# Patient Record
Sex: FEMALE | Race: WHITE
[De-identification: names, ages, dates, MRNs, and addresses within clinical notes are randomized per-mention and may not be internally consistent; named-entity substitution may affect disease eponyms.]

---

## 2017-01-10 ENCOUNTER — HOSPITAL ENCOUNTER (OUTPATIENT)
Dept: HOSPITAL 61 - PNCL | Age: 52
Discharge: HOME | End: 2017-01-10
Attending: ANESTHESIOLOGY
Payer: COMMERCIAL

## 2017-01-10 DIAGNOSIS — M96.1: ICD-10-CM

## 2017-01-10 DIAGNOSIS — M51.16: Primary | ICD-10-CM

## 2017-01-10 PROCEDURE — 62323 NJX INTERLAMINAR LMBR/SAC: CPT

## 2017-01-11 NOTE — PAIN
DATE OF SERVICE:  01/10/2017



DIAGNOSES:

1.  Lumbar radiculopathy with lumbar herniated disk.

2.  Post-lumbar laminectomy syndrome.



HISTORY OF PRESENT ILLNESS:  The patient is a 51-year-old female who returns for

followup status post caudal epidural steroid injection x 1 on 11/08/2016.  The

patient reports she did very well, about 85-90% improvement.  We had elected not

to pursue any further injections after the first injection.  She is doing so

well on her followup in late November. Patient reports that the pain is now

returning to a more significant extent in the low back and bilateral lower

extremities with some numbness in the toes on the right foot as well as sharp

pain in the leg and thigh, posterior calf, posterior gluteus with some numbness

in the thigh, also patient reports it has been increasing.  It is now 4 on a

scale of 10, was much worse about a week ago.  The patient reports no new motor

or sensory deficits, however, no new bowel or bladder incontinence or other

complaints.  The patient's old chart was reviewed as her current medication

regimen and updated.  Current review of systems updated today as well.



PHYSICAL EXAMINATION:

VITAL SIGNS:  The patient's blood pressure is 160/88, pulse 85, respirations 18,

temperature 98.2 degrees Fahrenheit, height is 70 inches, weighs 284 pounds.

GENERAL:  The patient is awake, alert, oriented, appropriate, very pleasant

demeanor.

HEENT:  Head shows normocephalic, atraumatic.  Extraocular movements are intact

and symmetrical.  Oral cavity shows mucous membranes moist and pink.

NECK:  Shows anterior throat supple.

CHEST:  Shows breath sounds clear to auscultation bilaterally.

HEART:  Shows S1 and S2 clear.

ABDOMEN:  Soft, nontender, nondistended.  No palpable organomegaly.  No rebound

or guarding demonstrated.

BACK:  The patient's back shows spine grossly midline.  Slight exaggeration of

thoracic kyphosis, some moderate flattening of lumbar lordotic curvature.  Well

healed surgical scarring is noted once again.  Lumbar paraspinous musculature

shows some moderate tenderness to palpation bilaterally but without radiation. 

No tenderness over the sacrum or sacroiliac regions.  The patient shows good

rotation and motion of the lumbar spine, both laterally as well as extension and

flexion.

EXTREMITIES:  Lower extremities show deep tendon reflexes 1+/4 in the patellar

and tendo calcaneus tendons are equal.  Motor exam is strong with 5/5

dorsiflexion and extension and equal bilaterally.



PLAN:  Options were discussed with the patient.  We will proceed with a caudal

approach epidural steroid injection today as the second in this series with

fluoroscopic guidance.  Risks were again discussed including, but not limited to

bleeding, infection, possibility of epidural hematoma, subsequent neurologic

compromise, dural puncture, headaches, spinal cord and/or nerve damage, side

effects of steroid medication and poor results regarding pain control.  The

patient understands and wishes to proceed.  The patient will return to clinic in

approximately 2 weeks for followup, was counseled on return appointment,

activity level and side effects to be aware of.



DIAGNOSIS:  Lumbar radiculopathy with lumbar herniated disk and post-lumbar

laminectomy syndrome.



PROCEDURES:  Caudal approach epidural steroid injection with fluoroscopic

guidance under sterile prep and drape using local anesthetic.



MEDICATIONS INJECTED: Depo-Medrol 120 mg plus 10 mL of preservative-free normal

saline and 2 mL of Isovue contrast.  



CONDITION AT DISCHARGE:  Stable.  The patient tolerated the procedure well, had

no complications.

 



______________________________

CHRISTOPHER MOON MD



DR:  THERESE/jenniffer  JOB#:  [id number] / [id number]

DD:  01/10/2017 12:38  DT:  01/11/2017 03:25

## 2017-01-24 ENCOUNTER — HOSPITAL ENCOUNTER (OUTPATIENT)
Dept: HOSPITAL 61 - PNCL | Age: 52
Discharge: HOME | End: 2017-01-24
Attending: ANESTHESIOLOGY
Payer: COMMERCIAL

## 2017-01-24 DIAGNOSIS — M51.16: Primary | ICD-10-CM

## 2017-01-24 DIAGNOSIS — M96.1: ICD-10-CM

## 2017-01-24 PROCEDURE — 62327 NJX INTERLAMINAR LMBR/SAC: CPT

## 2017-01-24 PROCEDURE — 62323 NJX INTERLAMINAR LMBR/SAC: CPT

## 2017-01-25 NOTE — PAIN
DATE OF SERVICE:  01/24/2017



DIAGNOSES:  Lumbar radiculopathy with lumbar herniated disk and post-lumbar

laminectomy syndrome.



HISTORY OF PRESENT ILLNESS:  The patient is a 51-year-old female who returns for

follow up status post caudal approach epidural steroid injections x 2.  The

patient reports approximately initially 85-90% improvement, but the last

injection only lasting about 3-4 days after the injection.  The patient reports

that she is still having significant pain across the low back and the bilateral

lower extremities, right greater than left as previously.  The patient reports

no new motor or sensory deficits, no new bowel or bladder incontinence or other

complaints, but still significant pain rated at 4/5 on a scale of 10, burning

and sharp in quality in the low back, radiating to the posterior gluteus,

posterior thighs, in the lower legs as well posteriorly and in the right and

left lateral and upper and lower leg as well, again worse on the right side.



PHYSICAL EXAMINATION:

VITAL SIGNS:  The patient's blood pressure is 134/82, pulse is 87, respirations

18, temperature 98.2 degrees Fahrenheit.  Height is 70 inches.  Weight is 279

pounds.

GENERAL:  The patient is awake, alert, oriented, appropriate, very pleasant

demeanor.

HEENT:  Shows normocephalic, atraumatic.  Extraocular movements are intact and

symmetrical.  Oral cavity shows mucous membranes are moist and pink.  Dentition

is intact.

NECK:  Shows anterior throat supple without palpable lymphadenopathy noted. 

Swallow reflex is symmetrical.

CHEST:  Shows normal on inspection.  Breath sounds are clear to auscultation

bilaterally.

HEART:  Shows S1 and S2 clear.

ABDOMEN:  Obese, soft, nontender, nondistended.

BACK:  Shows spine grossly in midline, mild flattening of lumbar lordotic

curvature again noted, a well-healed surgical scar in the lumbar distribution. 

Musculature appears symmetrical on inspection with palpation shows some moderate

tenderness with palpation in the low lumbar distribution bilaterally, but is

diffuse without specific trigger points radiation or asymmetry.  No tenderness

over the sacrum or sacroiliac regions.  The patient shows good rotation and

motion of the lumbar spine, both laterally as well as extension and flexion

without significant pain.

EXTREMITIES:  Lower extremities show deep tendon reflexes 1+ in the patellar and

tendo calcaneus tendons, are equal.  Motor exam is strong with dorsiflexion,

extension, quadriceps and hamstring flexion rated at 5/5 and symmetrical. 

Peripheral pulses are 1+ posterior tibial and dorsalis pedis pulses.



Options were discussed with the patient.  The patient's old chart was reviewed

as is her current medication regimen and updated.  Current review of systems

updated today as well.  We will plan on the third caudal approach epidural

steroid injection today with fluoroscopic guidance.  Risks were again discussed

including, but not limited to bleeding, infection, possibility of epidural

hematoma and subsequent neurological compromise, dural puncture, headaches,

spinal cord and/or nerve damage, side effects of steroid medication and poor

results regarding pain control.  The patient understands and wishes to proceed. 

The patient will proceed with the third injection in a series of three, ____

further injections about 6 months period of time from her first injection.  She

is following up with her neurosurgeon in approximately 1 week and will explore

possible alternatives with him as well.  The patient was counseled as to

activity levels as well as side effects to be aware of and to contact the office

with any further questions or needs.



DIAGNOSES:  Lumbar radiculopathy with lumbar herniated disk and post-lumbar

laminectomy syndrome.



PROCEDURE:  Caudal approach epidural steroid injection using the C-arm

fluoroscopic guidance, sterile prep and drape and local anesthesia.  Medications

injected 120 mg of Depo-Medrol plus 10 mL of preservative-free normal saline and

2 mL Isovue for contrast.



Condition at discharge is stable.  The patient tolerated the procedure well, had

no complications.

 



______________________________

CHRISTOPHER MOON MD



DR:  THERESE/jenniffer  JOB#:  [id number] / [id number]

DD:  01/24/2017 11:51  DT:  01/25/2017 01:47

## 2017-02-13 ENCOUNTER — HOSPITAL ENCOUNTER (OUTPATIENT)
Dept: HOSPITAL 61 - RAD | Age: 52
Discharge: HOME | End: 2017-02-13
Attending: NEUROLOGICAL SURGERY
Payer: COMMERCIAL

## 2017-02-13 VITALS
SYSTOLIC BLOOD PRESSURE: 158 MMHG | DIASTOLIC BLOOD PRESSURE: 81 MMHG | SYSTOLIC BLOOD PRESSURE: 158 MMHG | DIASTOLIC BLOOD PRESSURE: 81 MMHG | SYSTOLIC BLOOD PRESSURE: 158 MMHG | DIASTOLIC BLOOD PRESSURE: 81 MMHG

## 2017-02-13 VITALS — WEIGHT: 277 LBS | HEIGHT: 70 IN | BODY MASS INDEX: 39.65 KG/M2

## 2017-02-13 DIAGNOSIS — E04.1: Primary | ICD-10-CM

## 2017-02-13 DIAGNOSIS — M54.16: ICD-10-CM

## 2017-02-13 PROCEDURE — 72132 CT LUMBAR SPINE W/DYE: CPT

## 2017-02-13 PROCEDURE — 72265 MYELOGRAPHY L-S SPINE: CPT

## 2017-02-13 NOTE — RAD
Lumbar myelogram, 2/13/2017:



History: Lumbar radiculopathy



Under local anesthesia, aseptic conditions and fluoroscopic guidance a lumbar

puncture was performed at the L2-3 level utilizing a 25-gauge spinal needle.

Good clear CSF flow was obtained following which 14 cc of Omnipaque 180 was

injected into the thecal sac. The spinal needle was then removed and

hemostasis obtained. Digital imaging was then performed. The patient tolerated

the procedure well and was sent to CT in good. The following findings are

delineated on the myelogram:



1. In the standing position there are mild anterior extra dural defects at

L2-3, L3-4 and L4-5. No instability is evident on flexion and extension.

2. There is decreased opacification of the right S1 nerve root sleeve compared

to the left. There is slightly decreased opacification of the left L5 nerve

root sleeve compared to the right.

3. No other significant intradural or extradural abnormality is detected.





CT of the lumbar spine-post myelogram, 2/13/2017:



Multidetector CT imaging was performed with multiplanar reconstructions were

produced. The following findings are delineated:



1. At L1-2 there is no significant posterior disc bulge or protrusion. The

central spinal canal and neural foramina are well maintained.



2. At L2-3 there is no significant disc bulge or protrusion. There are mild

degenerative changes involving the facet joints. The central spinal canal and

neural foramina are well maintained.



3. At L3-4 there are moderate degenerative changes involving the facet joints

with vacuum phenomena at those joints. There is mild posterior ligamentous

thickening. There is mild posterior disc bulging. The thecal sac measures 10

mm in AP diameter at the midline. There is mild bilateral foraminal narrowing.



4. At L4-5 there are also moderate hypertrophic degenerative changes involving

the facet joints with mild posterior ligamentous thickening. There is mild

posterior disc bulging. Central spinal canal measures 10 mm in AP diameter at

the midline. There is only mild bilateral foraminal encroachment.



5. At L5-S1 the disc space is markedly narrowed with irregular sclerotic

margins. There are moderate anterior and posterior spurs. There are mild

degenerative changes involving the facet joints. The spurring is causing

moderate left foraminal encroachment and mild right inferior foraminal

narrowing at this level. There is a small density along the posterior margin

of the disc space centered just to the right of midline effacing the anterior

epidural fat at this level. This may represent a herniated disc fragment or

epidural scar. It is not causing significant narrowing of the thecal sac.

There is associated lack of filling of the proximal right S1 nerve root sleeve

compared to the left.





IMPRESSION:

1. Severe degenerative disc disease at L5-S1 with moderate left foraminal

encroachment and mild right foraminal narrowing due to spurring.

2. Abnormal right anterior epidural density at L5-S1, compatible with a

herniated disc fragment versus epidural scarring, with associated decreased

opacification of the right S1 nerve root sleeve.

3. Mild scattered degenerative changes at the other disc levels as described

above.

## 2017-03-09 ENCOUNTER — HOSPITAL ENCOUNTER (OUTPATIENT)
Dept: HOSPITAL 61 - KCIC MRI | Age: 52
Discharge: HOME | End: 2017-03-09
Attending: FAMILY MEDICINE
Payer: COMMERCIAL

## 2017-03-09 DIAGNOSIS — M48.02: Primary | ICD-10-CM

## 2017-03-09 PROCEDURE — 72141 MRI NECK SPINE W/O DYE: CPT

## 2017-03-09 NOTE — [facility]
PROCEDURE 

Cervical spine MRI without contrast. 

 

HISTORY 

Neck pain and stiffness and left upper extremity pain. 

 

TECHNIQUE 

Multiplanar and multi sequence magnetic resonance imaging of the cervical 

spine was performed without contrast. 

 

COMPARISON 

Radiographs dated 02/21/2017. 

 

FINDINGS 

There is minimal retrolisthesis of C4 on C5. There is degenerative 

endplate remodeling with disc space narrowing predominately at C6-C7, and 

to a lesser extent, C5-C6. There are endplate Schmorl's nodes at these 

levels. There is slight deformation of the spinal cord at these levels as 

well as T2-T3, described in detail below. No spinal cord signal 

abnormality is seen. There is mild diffusely decreased T1 marrow signal 

intensity, possibly due to mild anemia in a female patient of this age. No

suspicious osseous lesion is seen. 

 

At C2-C3, there is mild left greater than right facet arthropathy. There 

is minimal left foraminal stenosis. 

At C3-C4, there is mild bilateral facet arthropathy. There is mild left 

foraminal stenosis. 

At C4-C5, there is mild facet arthropathy. There is no stenosis. 

At C5-C6, there is a shallow right paracentral to foraminal disc 

protrusion superimposed on a disc bulge and endplate remodeling. There is 

mild facet arthropathy. There is right uncovertebral arthropathy. There is

mild to moderate right foraminal stenosis. There is flattening of the 

ventral aspect of the spinal cord with mild central canal stenosis 

measuring 9.2 mm in anterior-posterior dimension. 

At C6-C7, there is a right paracentral disc protrusion with slight 

superior and inferior extrusion and broad-based left paracentral to 

foraminal disc protrusion. These are superimposed on a diffuse disc bulge 

and endplate osteophytosis. There is uncovertebral arthropathy. There is 

mild bilateral foraminal stenosis. There is flattening of the right 

ventral aspect of the spinal cord with mild central canal stenosis 

measuring 8.3 mm in anterior-posterior dimension. 

At C7-T1, there is no stenosis. 

At T1-T2, there is a broad-based posterior central to left paracentral 

disc protrusion. There is no stenosis. 

At T2-T3, there are bilateral paracentral to foraminal disc protrusions. 

There is mild right foraminal stenosis. 

 

IMPRESSION 

Multilevel degenerative change within the cervical spine, described in 

detail above. This results in minimal left foraminal stenosis C2-C3, mild 

left foraminal stenosis at C3-C4, mild to moderate right foraminal and 

mild central canal stenosis at C5-C6, mild bilateral foraminal and central

canal stenosis at C6-C7, and mild right foraminal stenosis at T2-T3. 

 

 

Electronically signed by: Padmini Goetz (Mar 09, 2017 09:22:23)

## 2017-07-23 ENCOUNTER — HOSPITAL ENCOUNTER (EMERGENCY)
Dept: HOSPITAL 63 - ER | Age: 52
Discharge: HOME | End: 2017-07-23
Payer: COMMERCIAL

## 2017-07-23 VITALS — BODY MASS INDEX: 40.8 KG/M2 | HEIGHT: 70 IN | WEIGHT: 285 LBS

## 2017-07-23 VITALS — DIASTOLIC BLOOD PRESSURE: 62 MMHG | SYSTOLIC BLOOD PRESSURE: 150 MMHG

## 2017-07-23 DIAGNOSIS — I10: ICD-10-CM

## 2017-07-23 DIAGNOSIS — E11.9: ICD-10-CM

## 2017-07-23 DIAGNOSIS — M19.90: ICD-10-CM

## 2017-07-23 DIAGNOSIS — H53.10: Primary | ICD-10-CM

## 2017-07-23 DIAGNOSIS — H43.391: ICD-10-CM

## 2017-07-23 PROCEDURE — 99283 EMERGENCY DEPT VISIT LOW MDM: CPT

## 2017-07-23 NOTE — PHYS DOC
Past History


Past Medical History:  Arthritis, Diabetes, Hypertension


Past Surgical History:  Other


Alcohol Use:  Rarely


Drug Use:  None





Adult General


Chief Complaint


Chief Complaint:  EYE PROBLEMS





HPI


HPI





Patient is a 52-year-old female who presents ambulatory to the ED complaining 

of visual disturbance in the right eye since last evening. She states she has 

to floaters and some flashes of light in her right eye. Last evening she 

noticed a floater and also some flashes of light in her upper outer visual 

field. This morning, the flashes of light are still there, she still has a 

floater and now she has a second floater. She has no eye pain. Her vision seems 

normal except for these disturbances. She's never had this before. She has no 

history of retinal problems or retinal detachment. She sees an optometrist 

yearly at [facility] for an eye exam and to get glasses. She has seen an 

ophthalmologist in the past for the diagnosis of astigmatism. She's never had 

eye surgery. She does have a history of diabetes and hypertension.





Patient came to the ED because she was telling a friend about her symptoms and 

her friend thought her symptoms might be retinal detachment and urged her to 

come.





Review of Systems


Review of Systems





Constitutional: Denies fever or chills []


Eyes: As in history of present illness


HENT: Denies nasal congestion or sore throat []


Respiratory: Denies cough or shortness of breath []


Cardiovascular: Denies chest pain


Neurologic: She does have a headache but it is not particularly bad





Allergies


Allergies





Allergies








Coded Allergies Type Severity Reaction Last Updated Verified


 


  No Known Drug Allergies    3/12/17 No











Physical Exam


Physical Exam





Constitutional: Well developed, well nourished, no acute distress, non-toxic 

appearance. Alert, ambulatory, mentating normally. Visual acuities wearing 

glasses 20/15 each eye separately.


HENT: Normocephalic, atraumatic, bilateral external ears normal, nose normal. []


Eyes: PERRLA, EOMI, conjunctiva normal, no discharge. Corneas are clear 

bilaterally. Right eye funduscopic exam: Redness and is visualized relatively 

well and appears to be normal. I don't see any evidence of retinal abnormality 

or retinal detachment. I do not see any blood or cloudiness in the vitreous. I 

do see one of the floaters described by the patient which is more or less 

central in the visual field. Left eye funduscopic exam normal.


Neck: Normal range of motion, no stridor. [] 


Skin: Warm, dry, no erythema, no rash. [] 


Extremities: No tenderness, no cyanosis, no clubbing, ROM intact, no edema. [] 


Neurologic: Alert and oriented X 3, normal motor function, normal sensory 

function, no focal deficits noted. []





EKG


EKG


[]





Radiology/Procedures


Radiology/Procedures


[]





Course & Med Decision Making


Course & Med Decision Making


Pertinent Labs and Imaging studies reviewed. (See chart for details)





52-year-old female with hypertension and diabetes but without history of 

retinopathy presents with nonspecific visual disturbance for less than 24 

hours. Visual acuities are 20/15 right eye and left eye separately. I believe 

her complaints are nonspecific and not necessarily indicative of a concern for 

retinal detachment.





I discussed the case with Dr. Boyle, on-call for ophthalmology. He agreed with 

my assessment. He will be happy to see the patient in his office tomorrow. 

Patient is agreeable to that plan. See instructions for plan.





[]





Dragon Disclaimer


Dragon Disclaimer


This chart was dictated in whole or in part using Voice Recognition software in 

a busy, high-work load, and often noisy Emergency Department environment.  It 

may contain unintended and wholly unrecognized errors or omissions.





Departure


Departure:


Impression:  


 Primary Impression:  


 Subjective visual disturbance, right eye


 Additional Impression:  


 Vitreous floaters of right eye


Disposition:  01 HOME, SELF-CARE


Condition:  STABLE


Referrals:  


PCP,NO (PCP)





Additional Instructions:  


I discussed your case with the ophthalmologist on call, Dr. Boyle. He would be 

happy to see you in his office tomorrow. Call tomorrow morning and tell them 

you were in the ER today and he wants to see you on Monday in the office.





[phone]


[address] [address]





Problem Qualifiers











GREG JURADO MD Jul 23, 2017 11:26

## 2017-08-19 ENCOUNTER — HOSPITAL ENCOUNTER (EMERGENCY)
Dept: HOSPITAL 63 - ER | Age: 52
Discharge: HOME | End: 2017-08-19
Payer: COMMERCIAL

## 2017-08-19 VITALS — HEIGHT: 70 IN | BODY MASS INDEX: 40.8 KG/M2 | WEIGHT: 285 LBS

## 2017-08-19 VITALS — DIASTOLIC BLOOD PRESSURE: 66 MMHG | SYSTOLIC BLOOD PRESSURE: 132 MMHG

## 2017-08-19 DIAGNOSIS — E11.9: ICD-10-CM

## 2017-08-19 DIAGNOSIS — G89.29: Primary | ICD-10-CM

## 2017-08-19 DIAGNOSIS — M19.90: ICD-10-CM

## 2017-08-19 DIAGNOSIS — I10: ICD-10-CM

## 2017-08-19 DIAGNOSIS — N39.0: ICD-10-CM

## 2017-08-19 LAB
APTT PPP: YELLOW S
BILIRUB UR QL STRIP: (no result)
FIBRINOGEN PPP-MCNC: CLEAR MG/DL
GLUCOSE UR STRIP-MCNC: (no result) MG/DL
NITRITE UR QL STRIP: (no result)
SP GR UR STRIP: >=1.03
UROBILINOGEN UR-MCNC: 0.2 MG/DL

## 2017-08-19 PROCEDURE — 96376 TX/PRO/DX INJ SAME DRUG ADON: CPT

## 2017-08-19 PROCEDURE — 99284 EMERGENCY DEPT VISIT MOD MDM: CPT

## 2017-08-19 PROCEDURE — 87086 URINE CULTURE/COLONY COUNT: CPT

## 2017-08-19 PROCEDURE — 81025 URINE PREGNANCY TEST: CPT

## 2017-08-19 PROCEDURE — 96375 TX/PRO/DX INJ NEW DRUG ADDON: CPT

## 2017-08-19 PROCEDURE — 87186 SC STD MICRODIL/AGAR DIL: CPT

## 2017-08-19 PROCEDURE — 81001 URINALYSIS AUTO W/SCOPE: CPT

## 2017-08-19 PROCEDURE — 96374 THER/PROPH/DIAG INJ IV PUSH: CPT

## 2017-08-19 RX ADMIN — HYDROMORPHONE HYDROCHLORIDE PRN MG: 1 INJECTION, SOLUTION INTRAMUSCULAR; INTRAVENOUS; SUBCUTANEOUS at 09:27

## 2017-08-19 RX ADMIN — HYDROMORPHONE HYDROCHLORIDE PRN MG: 1 INJECTION, SOLUTION INTRAMUSCULAR; INTRAVENOUS; SUBCUTANEOUS at 08:50

## 2017-08-19 NOTE — PHYS DOC
Past History


Past Medical History:  Arthritis, Diabetes, Hypertension


Past Surgical History:  Other


Alcohol Use:  Rarely


Drug Use:  None





Adult General


Chief Complaint


Chief Complaint:  BACK PAIN OR INJURY





HPI


HPI


52-year-old female presenting to the emergency department with low back pain 

that she reports is similar to her previous sciatica. It is a sharp shooting 

pain that is moderate to severe in intensity intermittent and alleviated with 

Percocet that she takes at home. She last took her oxycodone approximately 2 

AM. She denies urinary incontinence fecal incontinence or worsening weakness in 

her legs. She denies fevers or chills. She denies polyuria or dysuria. She 

denies IV drug use. no hx of recent traumatic injury.





Review of systems negative for chest pain shortness of breath nausea vomiting. 

All other review of systems is negative unless otherwise noted in history of 

present illness.





ED course: 52-year-old female presenting with acute on chronic low back pain. 

Vital signs unremarkable. Pertinent physical examination findings show mild 

tenderness along the paraspinal musculature on the left more than the right. 

Nontender midline. No step-offs abrasions lacerations or ecchymosis or 

fluctuant masses. The patient was given IV saline and nausea with pain 

medications to help control her symptoms. On reexamination she was feeling 

better. Urinalysis suggestive of urinary tract infection. Oral Levaquin ordered 

for the patient. She was subsequently discharged home to follow-up with her 

PCP. The patient was then discharged home in stable condition to follow up with 

their primary care physician over the next 2-3 days. They were to return if 

their symptoms worsened or if they were concerned for any reason. Face-to-face 

discharge instructions and return precautions were given. Patient's questions 

were answered to their satisfaction. Patient is comfortable plan.





Review of Systems


Review of Systems


SEE ABOVE.





Current Medications


Current Medications





Current Medications








 Medications


  (Trade)  Dose


 Ordered  Sig/Jp  Start Time


 Stop Time Status Last Admin


Dose Admin


 


 Hydromorphone HCl


  (Dilaudid)  0.5 mg  PRN Q30MIN  PRN  8/19/17 08:30


   UNV  


 


 


 Ondansetron HCl


  (Zofran)  4 mg  1X  ONCE  8/19/17 08:30


 8/19/17 08:31 UNV  


 


 


 Sodium Chloride  500 ml @ 0


 mls/hr  1X  ONCE  8/19/17 08:30


 8/19/17 08:31 UNV  


 











Allergies


Allergies





Allergies








Coded Allergies Type Severity Reaction Last Updated Verified


 


  No Known Drug Allergies    3/12/17 No











Physical Exam


Physical Exam


See above





Constitutional: Well developed, well nourished, no acute distress, non-toxic 

appearance. []


HENT: Normocephalic, atraumatic, bilateral external ears normal, oropharynx 

moist, no oral exudates, nose normal. []


Eyes: PERRLA, EOMI, conjunctiva normal, no discharge. [] 


Neck: Normal range of motion, no tenderness, supple, no stridor. [] 


Cardiovascular:Heart rate regular rhythm, no murmur []


Lungs & Thorax:  Bilateral breath sounds clear to auscultation []


Abdomen: Bowel sounds normal, soft, no tenderness, no masses, no pulsatile 

masses. [] 


Skin: Warm, dry, no erythema, no rash. [] 


Back:See above


Extremities: No tenderness, no cyanosis, no clubbing, ROM intact, no edema. [] 2

+ deep tendon reflexes of the knees. 5 out of 5 strength in legs.


Neurologic: Alert and oriented X 3, normal motor function, normal sensory 

function, no focal deficits noted. []


Psychologic: Affect normal, judgement normal, mood normal. []





EKG


EKG


[]





Radiology/Procedures


Radiology/Procedures


[]





Course & Med Decision Making


Course & Med Decision Making


Pertinent Labs and Imaging studies reviewed. (See chart for details)





[]





Dragon Disclaimer


Dragon Disclaimer


This chart was dictated in whole or in part using Voice Recognition software in 

a busy, high-work load, and often noisy Emergency Department environment.  It 

may contain unintended and wholly unrecognized errors or omissions.





Departure


Departure:


Impression:  


 Primary Impression:  


 Back pain


 Additional Impression:  


 UTI (urinary tract infection)


Disposition:  01 HOME, SELF-CARE


Condition:  STABLE


Referrals:  


PCP,ART (PCP)








MATTHEW HINOJOSA MD


Patient Instructions:  Back Exercises, Back Pain, Adult





Additional Instructions:  


Thank you for allowing us to participate in your care today.





Followup with your primary care physician in 3 days if your symptoms do not 

improve.  Call your Primary Doctor tomorrow and inform them of your visit 

today.  If you do not have a primary care provider you can ask for a list of 

our primary care providers. Return to the emergency department you have any new 

or concerning findings.





This should be evaluated by the primary care physician and any necessary 

consulting services for continued management within a few days after discharge. 

Return to emergency room if you have any  new or concerning symptoms including 

but not limited to fever, chills, nausea, vomiting, intractable pain, any new 

rashes, chest pain, shortness of air, uncontrolled bleeding, difficulty 

breathing, and/or vision loss.


Scripts


Levofloxacin (LEVAQUIN) 500 Mg Tablet


1 TAB PO DAILY, #5 TAB


   Prov: KP ALANIZ MD         8/19/17





Problem Qualifiers











KP ALANIZ MD Aug 19, 2017 08:35

## 2020-02-13 ENCOUNTER — HOSPITAL ENCOUNTER (OUTPATIENT)
Dept: HOSPITAL 61 - KCIC MRI | Age: 55
Discharge: HOME | End: 2020-02-13
Attending: NURSE PRACTITIONER
Payer: OTHER GOVERNMENT

## 2020-02-13 DIAGNOSIS — M25.78: ICD-10-CM

## 2020-02-13 DIAGNOSIS — M50.323: Primary | ICD-10-CM

## 2020-02-13 DIAGNOSIS — G89.29: ICD-10-CM

## 2020-02-13 DIAGNOSIS — M48.02: ICD-10-CM

## 2020-02-13 DIAGNOSIS — M47.812: ICD-10-CM

## 2020-02-13 PROCEDURE — 72141 MRI NECK SPINE W/O DYE: CPT

## 2020-02-13 NOTE — [facility]
MRI Cervical Spine Without Contrast

 

History: Neck pain, hand numbness, bilateral upper extremity numbness and 

tingling getting worse

 

Technique: Multiplanar, multi sequential noncontrast MR imaging was 

performed of the cervical spine.

 

Comparison: March 9, 2017

 

Findings: There is some motion degradation. Cervical cord caliber is 

within normal limits, no defined or expansile signal abnormality, limited 

motion for subtle signal change due to motion. Cervical vertebral body 

stature is overall maintained. There is again advanced degenerative disc 

disease at C6-7 and to lesser degree at C5-C6. There is again somewhat 

diffuse narrowing of the cervical spinal canal on a developmental basis. 

AP alignment is unchanged, overall within normal limits.

 

C2-C3:  Neural foramina and spinal canal are adequate.

 

C3-C4:  There is very shallow posterior central protrusion. Central canal 

is borderline about 10 mm. There is fairly severe facet degenerative 

change bilaterally.  There is very mild posterior narrowing of the left 

neural foramen, right neural foramen adequate.

 

C4-C5:  There is very shallow posterior protrusion. Central canal is 

minimally narrowed about 9 to 10 mm. Neural foramina are adequate.

 

C5-C6:  There is minimal disc osteophyte complex and bulge, mild 

indentation upon the ventral thecal sac somewhat greater in the right 

lateral recess. Central canal is narrowed to about 9 mm also with mild 

right lateral recess stenosis. There is facet degenerative change 

bilaterally, also uncovertebral degenerative change greater on the right. 

There is likely moderate narrowing of the right neural foramen, probable 

minimal narrowing on the left. Neural foramina are somewhat difficult to 

accurately characterize due to motion.

 

C6-C7:   There is again disc osteophyte complex. Central canal is narrowed

to about 8 m. Neural foramina are overall adequate. There is facet 

degenerative change bilaterally.

 

C7-T1:  Spinal canal and neural foramina are adequate.

 

Impression: 

1.  There is again somewhat diffuse narrowing of the cervical spinal canal

on a developmental basis, additional mild spinal stenosis C4-5 to C6-7 as 

described, greatest at C6-7. There is again fairly advanced degenerative 

disc disease at C6-7 and to lesser degree at C5-C6, spondylosis at the 

same levels. There is probable moderate right and mild left C5-6 neural 

foramina compromise.

 

Electronically signed by: Alonso Santizo MD (2/13/2020 11:46 AM) 

[facility]

## 2022-03-11 ENCOUNTER — HOSPITAL ENCOUNTER (EMERGENCY)
Dept: HOSPITAL 63 - ER | Age: 57
Discharge: HOME | End: 2022-03-11
Payer: COMMERCIAL

## 2022-03-11 VITALS — DIASTOLIC BLOOD PRESSURE: 87 MMHG | SYSTOLIC BLOOD PRESSURE: 151 MMHG
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
## 2022-03-11 DIAGNOSIS — S06.0X9A: Primary | ICD-10-CM

## 2022-03-11 DIAGNOSIS — I10: ICD-10-CM
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
## 2022-03-11 DIAGNOSIS — E11.9: ICD-10-CM

## 2022-03-11 DIAGNOSIS — Z88.1: ICD-10-CM

## 2022-03-11 DIAGNOSIS — Z88.6: ICD-10-CM
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
## 2022-03-11 DIAGNOSIS — M54.2: ICD-10-CM

## 2022-03-11 DIAGNOSIS — Z88.2: ICD-10-CM

## 2022-03-11 DIAGNOSIS — G89.29: ICD-10-CM
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
## 2022-03-11 PROCEDURE — 70450 CT HEAD/BRAIN W/O DYE: CPT

## 2022-03-11 PROCEDURE — 99284 EMERGENCY DEPT VISIT MOD MDM: CPT

## 2022-03-11 PROCEDURE — 72125 CT NECK SPINE W/O DYE: CPT

## 2022-03-11 NOTE — RAD
PQRS Compliance Statement:



One or more of the following individualized dose reduction techniques were utilized for this examinat
ion:  

1. Automated exposure control  

2. Adjustment of the mA and/or kV according to patient size  

3. Use of iterative reconstruction technique





CT HEAD AND CERVICAL SPINE WITHOUT CONTRAST



History: Reason: fell and hit head no LOC. Headache.



Comparison: None.



Procedure: Axial images are obtained of the head from the skull base through the vertex without IV co
ntrast. Noncontrast helical CT of the cervical spine was performed.  Axial, sagittal, and coronal rec
onstructions were obtained. 



Findings: 

The ventricles and sulci are normal for the patient's age. 

No mass-effect, midline shift, hemorrhage or obvious acute infarction is identified.  Basilar cistern
s are patent.  



Bone windows demonstrate no significant calvarial abnormality. 

The visualized paranasal sinuses are clear. Mastoid air cells are well aerated. 



There is no evidence of acute fracture or acute malalignment of the cervical spine. 



There are no perched or jumped facet joints. The left C3/C4 facet joint is hypertrophic. There is str
aightening of normal cervical lordosis that may be positional or due to muscle spasm. There is disc s
pace narrowing and degenerative endplate spurring of C5/C6 and C6/C7. There is some degree of central
 canal stenosis at C6/C7, perhaps moderate. There is mild retroodontoid pannus formation and calcific
ation.



There are probable bilateral thyroid nodules. The visualized lung apices are clear.



IMPRESSION:

1.  No acute intracranial abnormality.  

2.  No acute fracture of the cervical spine.

3.  Probable bilateral thyroid nodules. Suggest outpatient thyroid ultrasound.



Electronically signed by: Mark Muro MD (3/11/2022 10:48 AM) [id number]

## 2022-03-11 NOTE — PHYS DOC
Past History


Past Medical History:  Diabetes, Hypertension, Other


Additional Past Medical Histor:  chronic pain; seasonal allergies


Past Surgical History:  Gastric Bypass, Other


Additional Past Surgical Histo:  spine stimulator; trigger finger; hernia/tummy 

tuck; parathyroid; back


Alcohol Use:  Occasionally


Drug Use:  None





General Adult


EDM:


Chief Complaint:  MECHANICAL FALL





HPI:


HPI:





Patient is a 57-year-old female that presents today with head pain.  Patient 

states around 9 AM today she fell on the ice landed on her buttocks area and 

then hit her head on the ground.  Patient states she had no loss of 

consciousness, and she was able to ambulate without any difficulty.  Patient is 

content discern with the increased pain on the right parietal area and neck 

pain.  Patient does have a history of chronic back pain for which she has a 

spinal cord stimulator in place.  Patient denies nausea or vomiting or shortness

of air.





Review of Systems:


Review of Systems:


Constitutional:  Denies fever or chills 


Eyes:  Denies change in visual acuity 


HENT: Head and neck pain denies nasal congestion or sore throat 


Respiratory:  Denies cough or shortness of breath 


Cardiovascular:  Denies chest pain or edema 


GI:  Denies abdominal pain, nausea, vomiting, bloody stools or diarrhea 


: Denies dysuria 


Musculoskeletal:  Denies back pain or joint pain 


Integument:  Denies rash 


Neurologic:  Denies headache, focal weakness or sensory changes 


Endocrine:  Denies polyuria or polydipsia 


Lymphatic:  Denies swollen glands 


Psychiatric:  Denies depression or anxiety





Allergies:


Allergies:





Allergies








Coded Allergies Type Severity Reaction Last Updated Verified


 


  NSAIDS (Non-Steroidal Anti-Inflamma Allergy Unknown  3/11/22 Yes


 


  Sulfa (Sulfonamide Antibiotics) Allergy Unknown  8/19/17 Yes


 


  duloxetine Allergy Unknown  8/19/17 Yes


 


  levofloxacin Allergy Unknown  3/11/22 Yes


 


Uncoded Allergies Type Severity Reaction Last Updated Verified


 


  SSRI Allergy Unknown  3/11/22 











Physical Exam:


PE:





Constitutional: Well developed, well nourished, no acute distress, non-toxic 

appearance. []


HENT: Inspection and palpation of the head demonstrates tenderness with 

palpation to the right parietal area, no lacerations, abrasions, or ecchymosis 

noted, bilateral external ears normal, oropharynx moist, no oral exudates, nose 

normal. []


Eyes: PERRLA, EOMI, conjunctiva normal, no discharge. [] 


Neck: Normal range of motion, no tenderness, supple, no stridor, no midline 

tenderness


Cardiovascular:Heart rate regular rhythm, no murmur []


Lungs & Thorax:  Bilateral breath sounds clear to auscultation []


Abdomen: Bowel sounds normal, soft, no tenderness, no masses, no pulsatile m

asses. [] 


Skin: Warm, dry, no erythema, no rash. [] 


Back: No tenderness, no CVA tenderness, no midline tenderness, spinal cord 

stimulator is noted


Extremities: No tenderness, no cyanosis, no clubbing, ROM intact, no edema, 

steady gait


Neurologic: Alert and oriented X 3, normal motor function, normal sensory 

function, no focal deficits noted. []


Psychologic: Affect normal, judgement normal, mood normal. []





Current Patient Data:


Vital Signs:





                                   Vital Signs








  Date Time  Temp Pulse Resp B/P (MAP) Pulse Ox O2 Delivery O2 Flow Rate FiO2


 


3/11/22 10:08 98.1 68 20 151/87 (108) 99 Room Air  











EKG:


EKG:


[]





Radiology/Procedures:


Radiology/Procedures:


[REASON: fell and hit head no LOC


PROCEDURE: CT HEAD AND CERVICAL SPINE WO





PQRS Compliance Statement:





One or more of the following individualized dose reduction techniques were 

utilized for this examination:  


1. Automated exposure control  


2. Adjustment of the mA and/or kV according to patient size  


3. Use of iterative reconstruction technique








CT HEAD AND CERVICAL SPINE WITHOUT CONTRAST





History: Reason: fell and hit head no LOC. Headache.





Comparison: None.





Procedure: Axial images are obtained of the head from the skull base through the

 vertex without IV contrast. Noncontrast helical CT of the cervical spine was 

performed.  Axial, sagittal, and coronal reconstructions were obtained. 





Findings: 


The ventricles and sulci are normal for the patient's age. 


No mass-effect, midline shift, hemorrhage or obvious acute infarction is 

identified.  Basilar cisterns are patent.  





Bone windows demonstrate no significant calvarial abnormality. 


The visualized paranasal sinuses are clear. Mastoid air cells are well aerated. 





There is no evidence of acute fracture or acute malalignment of the cervical 

spine. 





There are no perched or jumped facet joints. The left C3/C4 facet joint is 

hypertrophic. There is straightening of normal cervical lordosis that may be p

ositional or due to muscle spasm. There is disc space narrowing and degenerative

 endplate spurring of C5/C6 and C6/C7. There is some degree of central canal 

stenosis at C6/C7, perhaps moderate. There is mild retroodontoid pannus 

formation and calcification.





There are probable bilateral thyroid nodules. The visualized lung apices are 

clear.





IMPRESSION:


1.  No acute intracranial abnormality.  


2.  No acute fracture of the cervical spine.


3.  Probable bilateral thyroid nodules. Suggest outpatient thyroid ultrasound.





Electronically signed by: Mark Muro MD (3/11/2022 10:48 AM) [id number]]





Heart Score:


C/O Chest Pain:  No


Risk Factors:


Risk Factors:  DM, Current or recent (<one month) smoker, HTN, HLP, family 

history of CAD, obesity.


Risk Scores:


Score 0 - 3:  2.5% MACE over next 6 weeks - Discharge Home


Score 4 - 6:  20.3% MACE over next 6 weeks - Admit for Clinical Observation


Score 7 - 10:  72.7% MACE over next 6 weeks - Early Invasive Strategies





Course & Med Decision Making:


Course & Med Decision Making


Pertinent Labs and Imaging studies reviewed. (See chart for details)





1115 reassessment of patient shows her alert and orientated x2 the [place] 15, 

patient is complaining of a headache.  Reviewed radiological results with 

patient did inform her there is no acute findings at this time, I did inform her

that due to her headache and the nature of her fall that she may have a 

concussion, will send her home with [facility] [place] instructions for concussion, patient 

states that she has an appointment with her neurologist next Wednesday, November 16, 2022, did inform her to return to the emergency department he says any 

change in the level of consciousness.  Patient and  who was at the 

bedside verbalized understanding this agreeable to plan of care.





Dragon Disclaimer:


Dragon Disclaimer:


This electronic medical record was generated, in whole or in part, using a voice

recognition dictation system.





Departure


Departure:


Impression:  


   Primary Impression:  


   Fall


   Qualified Codes:  W19.XXXA - Unspecified fall, initial encounter


   Additional Impressions:  


   Concussion


   Qualified Codes:  S06.0X0A - Concussion without loss of consciousness, 

   initial encounter


   Head ache


   Qualified Codes:  R51.9 - Headache, unspecified


Disposition:  01 HOME / SELF CARE / HOMELESS


Condition:  STABLE


Referrals:  


NON,STAFF (PCP)


Patient Instructions:  Concussion and Brain Injury, General Headache Without 

Cause





Additional Instructions:  


Hydrocodone 1 to 2 tablets every 6 hours as needed for severe pain, use with 

caution may cause drowsiness or constipation


Over-the-counter Tylenol as needed for mild to moderate pain


Follow-up with your neurologist on March 16, 2022 as previously scheduled


Return to the emergency department if you have a change in level of 

consciousness, you have inability to use 1 side your body, or you have slurred 

speech.


Scripts


Hydrocodone Bit/Acetaminophen (HYDROCODONE-APAP 5-325  **) 1 Each Tablet


1 TAB PO PRN Q6HRS PRN for PAIN, #20 TAB 0 Refills


   Prov: MIGDALIA BISHOP APRMAURO         3/11/22











MIGDALIA BISHOP APRMAURO       Mar 11, 2022 10:31